# Patient Record
Sex: FEMALE | Race: WHITE
[De-identification: names, ages, dates, MRNs, and addresses within clinical notes are randomized per-mention and may not be internally consistent; named-entity substitution may affect disease eponyms.]

---

## 2022-01-03 ENCOUNTER — HOSPITAL ENCOUNTER (OUTPATIENT)
Dept: HOSPITAL 56 - MW.SDS | Age: 72
Discharge: HOME | End: 2022-01-03
Attending: SURGERY
Payer: MEDICARE

## 2022-01-03 DIAGNOSIS — M06.9: ICD-10-CM

## 2022-01-03 DIAGNOSIS — Z79.899: ICD-10-CM

## 2022-01-03 DIAGNOSIS — Z20.822: ICD-10-CM

## 2022-01-03 DIAGNOSIS — K63.5: ICD-10-CM

## 2022-01-03 DIAGNOSIS — M10.9: ICD-10-CM

## 2022-01-03 DIAGNOSIS — I10: ICD-10-CM

## 2022-01-03 DIAGNOSIS — Z01.812: ICD-10-CM

## 2022-01-03 DIAGNOSIS — Z98.890: ICD-10-CM

## 2022-01-03 DIAGNOSIS — D50.9: ICD-10-CM

## 2022-01-03 DIAGNOSIS — Z88.0: ICD-10-CM

## 2022-01-03 DIAGNOSIS — M81.0: ICD-10-CM

## 2022-01-03 DIAGNOSIS — Z12.11: Primary | ICD-10-CM

## 2022-01-03 DIAGNOSIS — E78.5: ICD-10-CM

## 2022-01-03 PROCEDURE — 45380 COLONOSCOPY AND BIOPSY: CPT

## 2022-01-03 PROCEDURE — U0002 COVID-19 LAB TEST NON-CDC: HCPCS

## 2022-01-03 NOTE — [id number]
Post Anesthesia Note





- EVALUATION WITHIN 48HRS OF ANESTHETIC


Vital Signs in Normal Range: Yes


Patient Participated in Evaluation: Yes


Respiratory Function Stable: Yes


Airway Patent: Yes


Cardiovascular Function Stable: Yes


Hydration Status Stable: Yes


Pain Control Satisfactory: Yes


Nausea and Vomiting Control Satisfactory: Yes


Mental Status Recovered: Yes


Vital Signs: 


                                Last Vital Signs











Temp  96.6 F L  01/03/22 10:24


 


Pulse  52 L  01/03/22 10:24


 


Resp  14   01/03/22 10:24


 


BP  118/58 L  01/03/22 10:24


 


Pulse Ox  99   01/03/22 10:24














- COMMENTS/OBSERVATIONS


Free Text/Narrative:: 





Pt doing well post-op. VSS. No apparent anesthetic complications.





Dr. Chong Boyce

## 2022-01-03 NOTE — OR
SURGEON:

Osmel Montoya M.D.

 

DATE OF PROCEDURE:  01/03/2022

 

OPERATION PERFORMED:

Colonoscopy with cold cecal polypectomy.

 

PRIMARY SURGEON:

Osmel Montoya M.D.

 

ANESTHESIA:

MAC.

 

ASA CLASSIFICATION:

II.

 

PREOPERATIVE DIAGNOSIS:

Desire for colorectal cancer screening.

 

POSTOPERATIVE DIAGNOSIS:

Cecal polyp.

 

DESCRIPTION OF PROCEDURE:

The patient was taken to the endoscopy room and positioned on the endoscopy

table in the left lateral decubitus position.  Time-out was called for

appropriate identification of the patient and procedure.  Monitored anesthesia

care was provided.  The colonoscope was inserted into the rectum and advanced

with minimal difficulty to the cecum.  The cecum was identified by internal

landmarks and external pressure.  One small polyp was encountered just adjacent

to the appendiceal orifice, and this was removed with cold biopsy forceps.  The

colonoscope was retroflexed to visualize the ascending colon from below, then

straightened and slowly withdrawn.  The ascending colon, hepatic flexure,

transverse colon, splenic flexure, descending colon, sigmoid colon, and rectum

were very well visualized.  No other tumors, polyps, diverticula, or

angiodysplastic changes were noted throughout the entire length of the colon.

Once the colonoscope was withdrawn to the rectum, it was retroflexed to

visualize the anal orifice from above.  Again, no tumors or polyps were seen.

There were no acute hemorrhoidal changes.  The colonoscope was then

straightened, the rectum aspirated, and the colonoscope removed.

 

The patient tolerated the procedure well and was taken to recovery room in

stable condition.

 

 

CONY / ETHAN

DD:  01/03/2022 10:09:46

DT:  01/03/2022 10:42:45

Job #:  [id number]

## 2022-01-03 NOTE — PCM.POSTAN
POST ANESTHESIA ASSESSMENT





- MENTAL STATUS


Mental Status: Alert, Oriented





- VITAL SIGNS


Vital Signs: 


                                Last Vital Signs











Temp  96.6 F L  01/03/22 10:24


 


Pulse  52 L  01/03/22 10:24


 


Resp  14   01/03/22 10:24


 


BP  118/58 L  01/03/22 10:24


 


Pulse Ox  99   01/03/22 10:24














- RESPIRATORY


Respiratory Status: Respiratory Rate WNL, Airway Patent, O2 Saturation Stable





- CARDIOVASCULAR


CV Status: Pulse Rate WNL, Blood Pressure Stable





- GASTROINTESTINAL


GI Status: No Symptoms





- PAIN


Pain Score: 0





- POST OP HYDRATION


Hydration Status: Adequate & Stable

## 2022-01-03 NOTE — PCM.OPNOTE
- General Post-Op/Procedure Note


Date of Surgery/Procedure: 01/03/22


Operative Procedure(s): Colonoscopy with cold cecal polypectomy


Pre Op Diagnosis: Desire for colorectal cancer screening


Post-Op Diagnosis: Cecal polyp


Anesthesia Technique: MAC (ASA II)


Primary Surgeon: Osmel Montoya


Assistant: Philippe Pandya


Condition: Good


Free Text/Narrative:: 


DICTATION [id number]





CPT CODE 06025

## 2025-04-11 NOTE — PCM.PREANE
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





- Procedure


Proposed Procedure: 





Colonoscopy





- Anesthesia/Transfusion/Family Hx


Anesthesia History: Prior Anesthesia Reaction


Other Type of Anesthesia Reaction Comment: was given penicillin during surgery 

and her throat closed


Family History of Anesthesia Reaction: No


Transfusion History: No Prior Transfusion(s)





- Review of Systems


General: No Symptoms


Pulmonary: No Symptoms


Cardiovascular: No Symptoms (HTN, HLD)


Gastrointestinal: No Symptoms


Neurological: No Symptoms


Other: Reports: None





- Physical Assessment


NPO Status Date: 01/02/22


NPO Status Time: 21:00


Vital Signs: 





                                Last Vital Signs











Temp  97.2 F   01/03/22 08:16


 


Pulse  67   01/03/22 08:16


 


Resp  16   01/03/22 08:16


 


BP  126/77   01/03/22 08:16


 


Pulse Ox  97   01/03/22 08:16











Height: 5 ft 4.5 in


Weight: 93.44 kg


ASA Class: 2


Mental Status: Alert & Oriented x3


Airway Class: Mallampati = 2


Dentition: Reports: Normal Dentition


Thyro-Mental Finger Breadths: 3


Mouth Opening Finger Breadths: 3


ROM/Head Extension: Full


Lungs: Clear to Auscultation, Normal Respiratory Effort


Cardiovascular: Regular Rate, Regular Rhythm





- Lab


Values: 





                             Laboratory Last Values











SARS-CoV-2 RNA ([facility])  NEGATIVE  (NEGATIVE)   01/03/22  06:45    














- Allergies


Allergies/Adverse Reactions: 


                                    Allergies











Allergy/AdvReac Type Severity Reaction Status Date / Time


 


Penicillins Allergy  Airway Verified 01/03/22 08:17





   Tightness  














- Anesthesia Plan


Beta Blocker: Metoprolol


Med Last Dose Date: 01/01/22


Med Last Dose Time: 09:30





- Acknowledgements


Anesthesia Type Planned: General Anesthesia


Pt an Appropriate Candidate for the Planned Anesthesia: Yes


Alternatives and Risks of Anesthesia Discussed w Pt/Guardian: Yes


Pt/Guardian Understands and Agrees with Anesthesia Plan: Yes





PreAnesthesia Questionnaire


HEENT History: Reports: Macular Degeneration, Other (See Below)


Other HEENT History: using reading glasses,  has upper dentures


Cardiovascular History: Reports: High Cholesterol, Hypertension


Respiratory History: Reports: None


Gastrointestinal History: Reports: None


Genitourinary History: Reports: None


OB/GYN History: Reports: None


Musculoskeletal History: Reports: Back Pain, Chronic, Fracture, Gout, 

Osteoporosis


Other Musculoskeletal History: hx of fx L3


Neurological History: Reports: None


Psychiatric History: Reports: None


Endocrine/Metabolic History: Reports: Obesity/BMI 30+


Hematologic History: Reports: Iron Deficiency


Immunologic History: Reports: None


Oncologic (Cancer) History: Reports: None


Dermatologic History: Reports: None





- Past Surgical History


Head Surgeries/Procedures: Reports: None


HEENT Surgical History: Reports: Tonsillectomy


Cardiovascular Surgical History: Reports: None


Respiratory Surgical History: Reports: None


GI Surgical History: Reports: Colonoscopy


Female  Surgical History: Reports: Breast Reduction, Hysterectomy


Endocrine Surgical History: Reports: None


Neurological Surgical History: Reports: None


Musculoskeletal Surgical History: Reports: Knee Replacement


Other Musculoskeletal Surgeries/Procedures:: bilateral TKA


Oncologic Surgical History: Reports: None


Dermatological Surgical History: Reports: None





- SUBSTANCE USE


Tobacco Use Status *Q: Never Tobacco User


Recreational Drug Use History: No





- HOME MEDS


Home Medications: 


                                    Home Meds





Acetaminophen [Tylenol Extra Strength] 500 mg PO Q4H PRN 12/28/21 [History]


Alendronate Sodium 10 mg PO DAILY 12/28/21 [History]


Calcium Carbonate/Vitamin D3 [Calcium 250+D] 1 tab PO DAILY 12/28/21 [History]


Cholecalciferol (Vitamin D3) [Vitamin D3] 50 mcg PO DAILY 12/28/21 [History]


Ferrous Sulfate [Iron] 325 mg PO DAILY 12/28/21 [History]


Lisinopril/Hydrochlorothiazide [Lisinopril-HCTZ 10-12.5 MG] 1 tab PO DAILY 

12/28/21 [History]


Metoprolol Succinate 25 mg PO DAILY 12/28/21 [History]


Multivit-Min/Folic Acid/Bpr909 [Alive Women's Gummy Vitamin] 1 tab PO DAILY 

12/28/21 [History]


Omega 3,6,9 Combination No.7 [Omega Dha] 1 tab PO BID 12/28/21 [History]


Rosuvastatin [Crestor] 10 mg PO DAILY 12/28/21 [History]


Vit C/E/Zn/Coppr/Lutein/Zeaxan [Preservision Areds 2 Softgel] 1 cap PO BID 

12/28/21 [History]


traMADol [Ultram] 50 mg PO BID PRN 12/28/21 [History]











- CURRENT (IN HOUSE) MEDS


Current Meds: 





                               Current Medications





Lactated Ringer's (Ringers, Lactated)  1,000 mls @ 125 mls/hr IV [facility]ED [facility]


   Last Admin: 01/03/22 08:27 Dose:  125 mls/hr


   Documented by: